# Patient Record
Sex: MALE
[De-identification: names, ages, dates, MRNs, and addresses within clinical notes are randomized per-mention and may not be internally consistent; named-entity substitution may affect disease eponyms.]

---

## 2021-11-05 ENCOUNTER — TRANSCRIPTION ENCOUNTER (OUTPATIENT)
Age: 41
End: 2021-11-05

## 2022-08-23 ENCOUNTER — APPOINTMENT (OUTPATIENT)
Dept: BARIATRICS | Facility: CLINIC | Age: 42
End: 2022-08-23

## 2022-08-23 ENCOUNTER — APPOINTMENT (OUTPATIENT)
Age: 42
End: 2022-08-23
Payer: COMMERCIAL

## 2022-08-23 VITALS
BODY MASS INDEX: 33.65 KG/M2 | HEIGHT: 70 IN | DIASTOLIC BLOOD PRESSURE: 105 MMHG | WEIGHT: 235.06 LBS | HEART RATE: 81 BPM | SYSTOLIC BLOOD PRESSURE: 145 MMHG | OXYGEN SATURATION: 97 % | TEMPERATURE: 98.1 F

## 2022-08-23 VITALS — DIASTOLIC BLOOD PRESSURE: 81 MMHG | SYSTOLIC BLOOD PRESSURE: 134 MMHG

## 2022-08-23 DIAGNOSIS — Z78.9 OTHER SPECIFIED HEALTH STATUS: ICD-10-CM

## 2022-08-23 PROBLEM — Z00.00 ENCOUNTER FOR PREVENTIVE HEALTH EXAMINATION: Status: ACTIVE | Noted: 2022-08-23

## 2022-08-23 PROCEDURE — G0447 BEHAVIOR COUNSEL OBESITY 15M: CPT | Mod: 59

## 2022-08-23 PROCEDURE — 99204 OFFICE O/P NEW MOD 45 MIN: CPT

## 2022-08-23 PROCEDURE — 36415 COLL VENOUS BLD VENIPUNCTURE: CPT

## 2022-08-23 PROCEDURE — XXXXX: CPT | Mod: 1L

## 2022-08-24 PROBLEM — Z78.9 SOCIAL ALCOHOL USE: Status: ACTIVE | Noted: 2022-08-24

## 2022-08-24 PROBLEM — Z78.9 NON-SMOKER: Status: ACTIVE | Noted: 2022-08-24

## 2022-08-24 RX ORDER — TESTOSTERONE 30 MG/1.5ML
SOLUTION TOPICAL
Refills: 0 | Status: ACTIVE | COMMUNITY

## 2022-08-24 NOTE — ASSESSMENT
[FreeTextEntry1] : Patient with BMI of 33.73. We discussed the importance of weight loss. We discussed the risks of continued excess weight on long term health. He met with the RD to discuss additional dietary changes he can make (see attached RD notes). Discussed making healthier choices when eating out. Will continue working out at [facility]. Will work on increasing weekly workouts. Discussed incorporating walking/running in his daily routine.\par Will complete labs to rule out secondary causes of weight gain. Pending labs will discuss medications to help supplement weight loss efforts. \par \par Elevated BP - Will continue to monitor.\par \par Low Testosterone  - Will request labs from PCP for review. \par \par labs done today in the office, will call with results. \par \par At least 15 minutes was spent during the visit on obesity counseling. \par \par f/u in 4-6 weeks\par

## 2022-08-24 NOTE — HISTORY OF PRESENT ILLNESS
[9] : 9 [Improved Health] : Improved health [Young Adult] : yound adult [Work stress] : work stress [Other: ____] : [unfilled] [Having a specific meal plan to follow] : having a specific meal plan to follow [FreeTextEntry2] : 235 [FreeTextEntry3] : 920 [I usually sleep 4-6 hours] : I usually sleep 4-6 hours [2+ miles] : Walking distance capability: 2+ miles [Walking] : walking [2] : 2 [60] : 60 [] : No [Other: ___] : [unfilled] [FreeTextEntry1] : 42 year old male presents for evaluation of obesity and weight gain. He reports that he began gaining weight as a young adult. At his highest he weighed 260 lbs, but has been working to lose weight over the last few years. He has lost 25 lbs by following a healthy lifestyle. Currently due to his work, he is unable to go to [employer] as often as he would like. In addition, he finds himself eating out 3 meals a day during the week due to his job and social obligations. He is motivated to lose more weight. \par Of note, he was diagnosed with low testosterone by his PCP not long ago as he felt very fatigued. He reports the workup showed no secondary causes, and he was started on Testosterone injections. He does not plan to have children at this time, so being on testosterone is not a concern. \par \par His BP is somewhat elevated today. On repeat it came down. He states that he had a large espresso on his way to the office. Reports PCP office checks BP at every visit before Testosterone injection and BP is 110s-120s/70-80s. He feels well and denies any chest pain, palpitations or shortness of breath.

## 2022-08-29 LAB
25(OH)D3 SERPL-MCNC: 36.4 NG/ML
ALBUMIN SERPL ELPH-MCNC: 4.6 G/DL
ALP BLD-CCNC: 48 U/L
ALT SERPL-CCNC: 24 U/L
ANION GAP SERPL CALC-SCNC: 13 MMOL/L
AST SERPL-CCNC: 24 U/L
BASOPHILS # BLD AUTO: 0.05 K/UL
BASOPHILS NFR BLD AUTO: 0.7 %
BILIRUB SERPL-MCNC: 0.4 MG/DL
BUN SERPL-MCNC: 23 MG/DL
CALCIUM SERPL-MCNC: 9.6 MG/DL
CHLORIDE SERPL-SCNC: 103 MMOL/L
CHOLEST SERPL-MCNC: 195 MG/DL
CO2 SERPL-SCNC: 24 MMOL/L
CREAT SERPL-MCNC: 1.05 MG/DL
EGFR: 92 ML/MIN/1.73M2
EOSINOPHIL # BLD AUTO: 0.22 K/UL
EOSINOPHIL NFR BLD AUTO: 3 %
ESTIMATED AVERAGE GLUCOSE: 114 MG/DL
FERRITIN SERPL-MCNC: 162 NG/ML
FOLATE SERPL-MCNC: 12.1 NG/ML
GLUCOSE SERPL-MCNC: 97 MG/DL
HBA1C MFR BLD HPLC: 5.6 %
HCT VFR BLD CALC: 48.6 %
HDLC SERPL-MCNC: 66 MG/DL
HGB BLD-MCNC: 15.9 G/DL
IMM GRANULOCYTES NFR BLD AUTO: 0.1 %
INSULIN SERPL-MCNC: 46 UU/ML
IRON SATN MFR SERPL: 18 %
IRON SERPL-MCNC: 75 UG/DL
LDLC SERPL CALC-MCNC: 89 MG/DL
LYMPHOCYTES # BLD AUTO: 1.48 K/UL
LYMPHOCYTES NFR BLD AUTO: 20.3 %
MAN DIFF?: NORMAL
MCHC RBC-ENTMCNC: 31.4 PG
MCHC RBC-ENTMCNC: 32.7 GM/DL
MCV RBC AUTO: 95.9 FL
MONOCYTES # BLD AUTO: 0.52 K/UL
MONOCYTES NFR BLD AUTO: 7.1 %
NEUTROPHILS # BLD AUTO: 5.02 K/UL
NEUTROPHILS NFR BLD AUTO: 68.8 %
NONHDLC SERPL-MCNC: 129 MG/DL
PLATELET # BLD AUTO: 225 K/UL
POTASSIUM SERPL-SCNC: 4.3 MMOL/L
PROT SERPL-MCNC: 7 G/DL
RBC # BLD: 5.07 M/UL
RBC # FLD: 13.3 %
SODIUM SERPL-SCNC: 139 MMOL/L
T4 FREE SERPL-MCNC: 0.9 NG/DL
T4 SERPL-MCNC: 4.6 UG/DL
THYROGLOB AB SERPL-ACNC: <20 IU/ML
THYROPEROXIDASE AB SERPL IA-ACNC: <10 IU/ML
TIBC SERPL-MCNC: 411 UG/DL
TRIGL SERPL-MCNC: 197 MG/DL
TSH SERPL-ACNC: 1.73 UIU/ML
UIBC SERPL-MCNC: 336 UG/DL
VIT B12 SERPL-MCNC: 581 PG/ML
WBC # FLD AUTO: 7.3 K/UL

## 2022-10-06 ENCOUNTER — APPOINTMENT (OUTPATIENT)
Dept: BARIATRICS | Facility: CLINIC | Age: 42
End: 2022-10-06

## 2022-10-07 ENCOUNTER — APPOINTMENT (OUTPATIENT)
Dept: BARIATRICS | Facility: CLINIC | Age: 42
End: 2022-10-07
Payer: COMMERCIAL

## 2022-10-07 ENCOUNTER — TRANSCRIPTION ENCOUNTER (OUTPATIENT)
Age: 42
End: 2022-10-07

## 2022-10-07 VITALS
DIASTOLIC BLOOD PRESSURE: 93 MMHG | TEMPERATURE: 98.4 F | HEIGHT: 70 IN | BODY MASS INDEX: 33.09 KG/M2 | SYSTOLIC BLOOD PRESSURE: 133 MMHG | HEART RATE: 89 BPM | WEIGHT: 231.13 LBS | OXYGEN SATURATION: 97 %

## 2022-10-07 PROCEDURE — ZZZZZ: CPT

## 2022-10-19 ENCOUNTER — APPOINTMENT (OUTPATIENT)
Dept: BARIATRICS | Facility: CLINIC | Age: 42
End: 2022-10-19

## 2022-10-19 VITALS
OXYGEN SATURATION: 97 % | DIASTOLIC BLOOD PRESSURE: 84 MMHG | HEIGHT: 70 IN | SYSTOLIC BLOOD PRESSURE: 127 MMHG | WEIGHT: 229.25 LBS | BODY MASS INDEX: 32.82 KG/M2 | TEMPERATURE: 98.2 F | HEART RATE: 76 BPM

## 2022-10-19 PROCEDURE — G0447 BEHAVIOR COUNSEL OBESITY 15M: CPT | Mod: 59

## 2022-10-19 PROCEDURE — 99213 OFFICE O/P EST LOW 20 MIN: CPT

## 2022-10-19 NOTE — HISTORY OF PRESENT ILLNESS
[FreeTextEntry1] : 42 year old male presents for follow up of obesity and weight gain. He reports that he began gaining weight as a young adult. At his highest he weighed 260 lbs, but has been working to lose weight over the last few years. He has lost 25 lbs by following a healthy lifestyle. Currently due to his work, he is unable to go to [employer] as often as he would like. In addition, he finds himself eating out 3 meals a day during the week due to his job and social obligations. Since his last visit, he has made changes to his diet. He lost 6 lbs. He is taking Mounjaro for insulin resistance and feels well on it. Denies any GI side effects. \par Of note, he was diagnosed with low testosterone by his PCP not long ago as he felt very fatigued. He reports the workup showed no secondary causes, and he was started on Testosterone injections. He does not plan to have children at this time, so being on testosterone is not a concern. \par \par BP today is wnl. \par \par

## 2022-10-19 NOTE — ASSESSMENT
[FreeTextEntry1] : Patient with improving BMI. HE will continue Mounjaro and do a trial of 5 mg weekly. Continue to focus on lifestyle modifications.  We discussed at length the importance of following a carbohydrate balanced diet and the importance of incorporating protein in meals. We also discussed appropriate alternative food choices. We discussed ways he can incorporate exercise into his daily routine. We discussed the importance of weight loss in the management of his comorbidities. We discussed the risks of continued excess weight on long term health. At least 15 minutes was spent during the visit on obesity counseling. \par \par Elevated BP - Will continue to monitor.\par \par f/u in 6 weeks

## 2022-12-02 ENCOUNTER — APPOINTMENT (OUTPATIENT)
Dept: BARIATRICS | Facility: CLINIC | Age: 42
End: 2022-12-02

## 2022-12-29 ENCOUNTER — NON-APPOINTMENT (OUTPATIENT)
Age: 42
End: 2022-12-29

## 2023-01-02 ENCOUNTER — RX RENEWAL (OUTPATIENT)
Age: 43
End: 2023-01-02

## 2023-01-13 ENCOUNTER — APPOINTMENT (OUTPATIENT)
Dept: BARIATRICS | Facility: CLINIC | Age: 43
End: 2023-01-13
Payer: COMMERCIAL

## 2023-01-13 VITALS
HEART RATE: 78 BPM | BODY MASS INDEX: 32.21 KG/M2 | HEIGHT: 70 IN | OXYGEN SATURATION: 95 % | DIASTOLIC BLOOD PRESSURE: 85 MMHG | WEIGHT: 225 LBS | TEMPERATURE: 97.3 F | SYSTOLIC BLOOD PRESSURE: 127 MMHG

## 2023-01-13 DIAGNOSIS — R79.89 OTHER SPECIFIED ABNORMAL FINDINGS OF BLOOD CHEMISTRY: ICD-10-CM

## 2023-01-13 PROCEDURE — 99214 OFFICE O/P EST MOD 30 MIN: CPT

## 2023-01-13 PROCEDURE — 36415 COLL VENOUS BLD VENIPUNCTURE: CPT

## 2023-01-13 PROCEDURE — G0447 BEHAVIOR COUNSEL OBESITY 15M: CPT | Mod: 59

## 2023-01-15 PROBLEM — R79.89 LOW TESTOSTERONE IN MALE: Status: ACTIVE | Noted: 2022-08-23

## 2023-01-15 NOTE — ASSESSMENT
[FreeTextEntry1] : Patient with improving BMI. He will continue Mounjaro and do a trial of 7.5 mg weekly. Continue to focus on lifestyle modifications. We discussed at length the importance of following a carbohydrate balanced diet and the importance of incorporating protein in meals. We also discussed appropriate alternative food choices. We discussed ways he can incorporate exercise into his daily routine. We discussed the importance of weight loss in the management of his comorbidities. We discussed the risks of continued excess weight on long term health. At least 15 minutes was spent during the visit on obesity counseling. \par \par Elevated BP - Will continue to monitor.\par \par Fasting labs done today in the office, will call with results. \par \par f/u in 6 weeks.

## 2023-01-15 NOTE — HISTORY OF PRESENT ILLNESS
[FreeTextEntry1] : 42 year old male presents for follow up of obesity and weight gain. He reports that he began gaining weight as a young adult. At his highest he weighed 260 lbs, but has been working to lose weight over the last few years. He has lost 25 lbs by following a healthy lifestyle. But his weight loss plateaued. \par Since his last visit, he has made changes to his diet. He lost 4 lbs. Lost a total of 10 lbs. He is taking Mounjaro for insulin resistance and feels well on it. Denies any GI side effects. \par Of note, he was diagnosed with low testosterone by his PCP not long ago as he felt very fatigued. He reports the workup showed no secondary causes, and he was started on Testosterone injections. He does not plan to have children at this time, so being on testosterone is not a concern. \par \par BP today is wnl. \par \par

## 2023-01-17 ENCOUNTER — APPOINTMENT (OUTPATIENT)
Dept: BARIATRICS | Facility: CLINIC | Age: 43
End: 2023-01-17

## 2023-01-18 LAB
ALBUMIN SERPL ELPH-MCNC: 4.9 G/DL
ALP BLD-CCNC: 47 U/L
ALT SERPL-CCNC: 29 U/L
ANION GAP SERPL CALC-SCNC: 13 MMOL/L
AST SERPL-CCNC: 27 U/L
BASOPHILS # BLD AUTO: 0.06 K/UL
BASOPHILS NFR BLD AUTO: 1.2 %
BILIRUB SERPL-MCNC: 0.7 MG/DL
BUN SERPL-MCNC: 18 MG/DL
CALCIUM SERPL-MCNC: 9.7 MG/DL
CHLORIDE SERPL-SCNC: 102 MMOL/L
CHOLEST SERPL-MCNC: 194 MG/DL
CO2 SERPL-SCNC: 24 MMOL/L
CREAT SERPL-MCNC: 0.83 MG/DL
EGFR: 113 ML/MIN/1.73M2
EOSINOPHIL # BLD AUTO: 0.3 K/UL
EOSINOPHIL NFR BLD AUTO: 6 %
ESTIMATED AVERAGE GLUCOSE: 103 MG/DL
GLUCOSE SERPL-MCNC: 91 MG/DL
HBA1C MFR BLD HPLC: 5.2 %
HCT VFR BLD CALC: 48.3 %
HDLC SERPL-MCNC: 63 MG/DL
HGB BLD-MCNC: 16.4 G/DL
IMM GRANULOCYTES NFR BLD AUTO: 0.2 %
LDLC SERPL CALC-MCNC: 95 MG/DL
LYMPHOCYTES # BLD AUTO: 1.56 K/UL
LYMPHOCYTES NFR BLD AUTO: 31 %
MAN DIFF?: NORMAL
MCHC RBC-ENTMCNC: 31.2 PG
MCHC RBC-ENTMCNC: 34 GM/DL
MCV RBC AUTO: 91.8 FL
MONOCYTES # BLD AUTO: 0.49 K/UL
MONOCYTES NFR BLD AUTO: 9.7 %
NEUTROPHILS # BLD AUTO: 2.62 K/UL
NEUTROPHILS NFR BLD AUTO: 51.9 %
NONHDLC SERPL-MCNC: 130 MG/DL
PLATELET # BLD AUTO: 250 K/UL
POTASSIUM SERPL-SCNC: 4.9 MMOL/L
PROT SERPL-MCNC: 7.2 G/DL
RBC # BLD: 5.26 M/UL
RBC # FLD: 13.6 %
SODIUM SERPL-SCNC: 138 MMOL/L
T4 FREE SERPL-MCNC: 1 NG/DL
T4 SERPL-MCNC: 5.3 UG/DL
TRIGL SERPL-MCNC: 175 MG/DL
TSH SERPL-ACNC: 1.64 UIU/ML
WBC # FLD AUTO: 5.04 K/UL

## 2023-03-17 ENCOUNTER — APPOINTMENT (OUTPATIENT)
Dept: BARIATRICS | Facility: CLINIC | Age: 43
End: 2023-03-17

## 2023-03-17 ENCOUNTER — APPOINTMENT (OUTPATIENT)
Dept: BARIATRICS/WEIGHT MGMT | Facility: CLINIC | Age: 43
End: 2023-03-17
Payer: COMMERCIAL

## 2023-03-17 ENCOUNTER — NON-APPOINTMENT (OUTPATIENT)
Age: 43
End: 2023-03-17

## 2023-03-17 VITALS
WEIGHT: 220 LBS | HEART RATE: 80 BPM | OXYGEN SATURATION: 98 % | DIASTOLIC BLOOD PRESSURE: 92 MMHG | SYSTOLIC BLOOD PRESSURE: 132 MMHG | TEMPERATURE: 98.2 F | BODY MASS INDEX: 31.5 KG/M2 | HEIGHT: 70 IN

## 2023-03-17 PROCEDURE — G0447 BEHAVIOR COUNSEL OBESITY 15M: CPT | Mod: 59

## 2023-03-17 PROCEDURE — 99213 OFFICE O/P EST LOW 20 MIN: CPT

## 2023-03-18 NOTE — HISTORY OF PRESENT ILLNESS
[FreeTextEntry1] : 43 year old male presents for follow up of obesity and weight gain. He reports that he began gaining weight as a young adult. At his highest he weighed 260 lbs, but has been working to lose weight over the last few years. He has lost 25 lbs by following a healthy lifestyle. But his weight loss plateaued. \par Since his last visit, continues to follow a healthy diet. He lost 5 lbs. Lost a total of 15 lbs. He is taking Mounjaro for insulin resistance and feels well on it. Denies any GI side effects. He was supposed to increase to 7.5 mg but was unable to get it due to shortages. He has been going to [employer] 2x a week. \par Of note, he was diagnosed with low testosterone by his PCP not long ago as he felt very fatigued. He reports the workup showed no secondary causes, and he was started on Testosterone injections. He does not plan to have children at this time, so being on testosterone is not a concern. \par \par BP today is wnl. \par \par

## 2023-03-18 NOTE — ASSESSMENT
[FreeTextEntry1] : Patient with improving BMI. He will continue Mounjaro and do a trial of 7.5 mg weekly. Continue to focus on lifestyle modifications. We discussed at length the importance of following a carbohydrate balanced diet and the importance of incorporating protein in meals. We also discussed appropriate alternative food choices. We discussed ways he can incorporate exercise into his daily routine. We discussed the importance of weight loss in the management of his comorbidities. We discussed the risks of continued excess weight on long term health. At least 15 minutes was spent during the visit on obesity counseling. \par \par Elevated BP - Will continue to monitor.\par \par f/u in 6-8 weeks.

## 2023-05-19 ENCOUNTER — APPOINTMENT (OUTPATIENT)
Age: 43
End: 2023-05-19

## 2023-05-19 ENCOUNTER — APPOINTMENT (OUTPATIENT)
Dept: BARIATRICS/WEIGHT MGMT | Facility: CLINIC | Age: 43
End: 2023-05-19

## 2023-11-22 ENCOUNTER — APPOINTMENT (OUTPATIENT)
Dept: BARIATRICS | Facility: CLINIC | Age: 43
End: 2023-11-22
Payer: COMMERCIAL

## 2023-11-22 VITALS
OXYGEN SATURATION: 96 % | DIASTOLIC BLOOD PRESSURE: 86 MMHG | SYSTOLIC BLOOD PRESSURE: 124 MMHG | WEIGHT: 215.44 LBS | HEIGHT: 70 IN | TEMPERATURE: 97 F | BODY MASS INDEX: 30.84 KG/M2 | HEART RATE: 89 BPM

## 2023-11-22 DIAGNOSIS — E88.819 INSULIN RESISTANCE, UNSPECIFIED: ICD-10-CM

## 2023-11-22 DIAGNOSIS — E66.9 OBESITY, UNSPECIFIED: ICD-10-CM

## 2023-11-22 DIAGNOSIS — R53.83 OTHER FATIGUE: ICD-10-CM

## 2023-11-22 PROCEDURE — 99214 OFFICE O/P EST MOD 30 MIN: CPT

## 2023-11-22 PROCEDURE — G0447 BEHAVIOR COUNSEL OBESITY 15M: CPT | Mod: 59

## 2024-04-16 RX ORDER — TIRZEPATIDE 7.5 MG/.5ML
7.5 INJECTION, SOLUTION SUBCUTANEOUS
Qty: 3 | Refills: 0 | Status: ACTIVE | COMMUNITY
Start: 2024-02-13 | End: 1900-01-01

## 2024-04-16 RX ORDER — TIRZEPATIDE 12.5 MG/.5ML
12.5 INJECTION, SOLUTION SUBCUTANEOUS
Qty: 1 | Refills: 2 | Status: ACTIVE | COMMUNITY
Start: 2022-08-29 | End: 1900-01-01

## 2024-08-07 ENCOUNTER — APPOINTMENT (OUTPATIENT)
Dept: BARIATRICS | Facility: CLINIC | Age: 44
End: 2024-08-07

## 2024-08-07 PROCEDURE — 99213 OFFICE O/P EST LOW 20 MIN: CPT

## 2024-08-07 NOTE — HISTORY OF PRESENT ILLNESS
[FreeTextEntry1] : 43 year old male presents for follow up of obesity and weight gain. He reports that he began gaining weight as a young adult. At his highest he weighed 260 lbs, but has been working to lose weight over the last few years. He has lost 25 lbs by following a healthy lifestyle. But his weight loss plateaued.  Since his last visit, continues to follow a healthy diet. He lost 5 lbs. Lost a total of 20 lbs. He is taking Mounjaro for insulin resistance and feels well on it. Denies any GI side effects. He has been going to [employer] 3x a week.  Of note, he was diagnosed with low testosterone by his PCP not long ago as he felt very fatigued. He reports the workup showed no secondary causes, and he was started on Testosterone injections. He does not plan to have children at this time, so being on testosterone is not a concern.   8/7/24 Since last visit reports he has been consistent with lifestyle changes. He goes to [employer] multiple times a week and eats a healthy diet. He reports he has lost another 15 lbs. Currently taking Zepbound 12.5 mg weekly. Feels well and denies any issues. Had recent labs with PCP and states everything wnl.

## 2024-08-07 NOTE — PHYSICAL EXAM
[FreeTextEntry1] :  General: Awake, alert, non- diaphoretic. In no acute distress. Well nourished. Head: Normocephalic  Skin: No obvious bruises or rashes on hands and face. There are no typical cushingoid features. Eyes: No swelling, lid lag or orbitopathy. No conjunctival injection Thyroid: No obvious goiter Respiratory: no increased respiratory effort. Able to speak in clear sentences. Neurological: Speech normal rate.  Psychiatric: normal affect. Cooperative Musculoskeletal: UE- free range of motion Extremities: No tremor. No edema (patient palpated for pitting)

## 2024-08-07 NOTE — ASSESSMENT
[FreeTextEntry1] : Patient with improving BMI. He will continue Frhbasmy54.5 mg weekly. Continue to focus on lifestyle modifications. At least 15 minutes was spent during the visit on obesity counseling.  Can consider body comp testing  Elevated BP - Has improved. Will continue to monitor.  Had labs done with PCP recently will request for review  f/u in 6 months

## 2024-12-23 ENCOUNTER — RX RENEWAL (OUTPATIENT)
Age: 44
End: 2024-12-23

## 2025-02-12 ENCOUNTER — APPOINTMENT (OUTPATIENT)
Dept: BARIATRICS | Facility: CLINIC | Age: 45
End: 2025-02-12

## 2025-04-16 ENCOUNTER — APPOINTMENT (OUTPATIENT)
Dept: BARIATRICS | Facility: CLINIC | Age: 45
End: 2025-04-16
Payer: COMMERCIAL

## 2025-04-16 ENCOUNTER — NON-APPOINTMENT (OUTPATIENT)
Age: 45
End: 2025-04-16

## 2025-04-16 VITALS
HEIGHT: 70 IN | DIASTOLIC BLOOD PRESSURE: 88 MMHG | SYSTOLIC BLOOD PRESSURE: 132 MMHG | WEIGHT: 236 LBS | HEART RATE: 104 BPM | TEMPERATURE: 97.9 F | BODY MASS INDEX: 33.79 KG/M2 | OXYGEN SATURATION: 98 %

## 2025-04-16 DIAGNOSIS — R53.83 OTHER FATIGUE: ICD-10-CM

## 2025-04-16 DIAGNOSIS — E66.9 OBESITY, UNSPECIFIED: ICD-10-CM

## 2025-04-16 DIAGNOSIS — R79.89 OTHER SPECIFIED ABNORMAL FINDINGS OF BLOOD CHEMISTRY: ICD-10-CM

## 2025-04-16 DIAGNOSIS — E88.819 INSULIN RESISTANCE, UNSPECIFIED: ICD-10-CM

## 2025-04-16 PROCEDURE — 99214 OFFICE O/P EST MOD 30 MIN: CPT | Mod: 25

## 2025-04-16 PROCEDURE — G0447 BEHAVIOR COUNSEL OBESITY 15M: CPT | Mod: 59

## 2025-09-10 ENCOUNTER — APPOINTMENT (OUTPATIENT)
Dept: BARIATRICS | Facility: CLINIC | Age: 45
End: 2025-09-10
Payer: COMMERCIAL

## 2025-09-10 VITALS
SYSTOLIC BLOOD PRESSURE: 131 MMHG | TEMPERATURE: 97.7 F | HEIGHT: 70 IN | HEART RATE: 83 BPM | OXYGEN SATURATION: 96 % | DIASTOLIC BLOOD PRESSURE: 89 MMHG | BODY MASS INDEX: 33.53 KG/M2 | WEIGHT: 234.19 LBS

## 2025-09-10 DIAGNOSIS — E88.819 INSULIN RESISTANCE, UNSPECIFIED: ICD-10-CM

## 2025-09-10 DIAGNOSIS — R53.83 OTHER FATIGUE: ICD-10-CM

## 2025-09-10 DIAGNOSIS — E66.9 OBESITY, UNSPECIFIED: ICD-10-CM

## 2025-09-10 PROCEDURE — 99214 OFFICE O/P EST MOD 30 MIN: CPT | Mod: 25

## 2025-09-10 PROCEDURE — G0447 BEHAVIOR COUNSEL OBESITY 15M: CPT | Mod: 59
